# Patient Record
Sex: FEMALE | Race: WHITE | URBAN - METROPOLITAN AREA
[De-identification: names, ages, dates, MRNs, and addresses within clinical notes are randomized per-mention and may not be internally consistent; named-entity substitution may affect disease eponyms.]

---

## 2017-03-09 ENCOUNTER — TELEPHONE (OUTPATIENT)
Dept: FAMILY MEDICINE CLINIC | Facility: CLINIC | Age: 32
End: 2017-03-09

## 2017-03-09 RX ORDER — ERGOCALCIFEROL 1.25 MG/1
CAPSULE ORAL
Refills: 0 | Status: CANCELLED | OUTPATIENT
Start: 2017-03-09

## 2017-03-09 RX ORDER — ERGOCALCIFEROL 1.25 MG/1
CAPSULE ORAL
COMMUNITY
Start: 2016-11-04 | End: 2017-04-04

## 2017-03-09 NOTE — TELEPHONE ENCOUNTER
Called patient to ask which pharmacy she wants vit d sent to [facility],  Left message for patient to return my call.    03/09/2017, 4:30 PM

## 2017-03-13 RX ORDER — BIOTIN 1 MG
1 TABLET ORAL DAILY
Qty: 30 CAPSULE | Refills: 0 | COMMUNITY
Start: 2017-03-13

## 2017-03-13 NOTE — TELEPHONE ENCOUNTER
Pt informed, states she finished her supplement 2 wks ago. Pt will call back to get scheduled for lab appt. Renewal request from [facility] for 50,000 IU removed. Pt  States she will take 1,000 IU daily interim. Okay?

## 2017-04-04 ENCOUNTER — APPOINTMENT (OUTPATIENT)
Dept: LAB | Age: 32
End: 2017-04-04
Attending: FAMILY MEDICINE
Payer: COMMERCIAL

## 2017-04-04 ENCOUNTER — OFFICE VISIT (OUTPATIENT)
Dept: FAMILY MEDICINE CLINIC | Facility: CLINIC | Age: 32
End: 2017-04-04

## 2017-04-04 VITALS
DIASTOLIC BLOOD PRESSURE: 70 MMHG | HEIGHT: 67 IN | HEART RATE: 100 BPM | TEMPERATURE: 98 F | RESPIRATION RATE: 12 BRPM | SYSTOLIC BLOOD PRESSURE: 110 MMHG | WEIGHT: 164 LBS | BODY MASS INDEX: 25.74 KG/M2

## 2017-04-04 DIAGNOSIS — E55.9 VITAMIN D DEFICIENCY: ICD-10-CM

## 2017-04-04 DIAGNOSIS — J02.0 STREP PHARYNGITIS: Primary | ICD-10-CM

## 2017-04-04 PROCEDURE — 87880 STREP A ASSAY W/OPTIC: CPT | Performed by: NURSE PRACTITIONER

## 2017-04-04 PROCEDURE — 82306 VITAMIN D 25 HYDROXY: CPT

## 2017-04-04 PROCEDURE — 99213 OFFICE O/P EST LOW 20 MIN: CPT | Performed by: NURSE PRACTITIONER

## 2017-04-04 RX ORDER — AMOXICILLIN 875 MG/1
875 TABLET, COATED ORAL 2 TIMES DAILY
Qty: 20 TABLET | Refills: 0 | Status: SHIPPED | OUTPATIENT
Start: 2017-04-04 | End: 2017-04-14

## 2017-04-05 NOTE — PATIENT INSTRUCTIONS
Directed to take antibiotics until gone. Recommend to eat yogurt or take probiotic daily while on antibiotic. Treat symptoms as needed. No public exposure until on antibiotic for at least 24 hours. Return to clinic if not better in 48-72 hours.   Other

## 2017-04-05 NOTE — PROGRESS NOTES
HPI:    Patient ID: Yohan Gee is a 27year old female. HPI     Present with concerns about a sore throat. States her throat feels like there is nice in her throat. No known exposure to any strep infection.   States she has kids at home they are normal and breath sounds normal. No respiratory distress. She has no wheezes. She has no rales. Musculoskeletal: Normal range of motion. Lymphadenopathy:     She has cervical adenopathy.    Neurological: She is alert and oriented to person, place, and t

## 2017-04-06 ENCOUNTER — TELEPHONE (OUTPATIENT)
Dept: FAMILY MEDICINE CLINIC | Facility: CLINIC | Age: 32
End: 2017-04-06

## 2017-04-06 NOTE — TELEPHONE ENCOUNTER
Patient informed of results. Expressed understanding.    Patient is currently taking 1000U daily of Vitamin D

## 2017-04-06 NOTE — TELEPHONE ENCOUNTER
Ok, then recommend 2000 iu daily-we discussed further with Dr. Sherida Bloch at her next physical.  Which it looks like she is due for any time, please have patient schedule.

## 2017-04-06 NOTE — TELEPHONE ENCOUNTER
Did she fill the prescription and take the once a week prescription vitamin D 50,000 units was given back in November? If not I would give her the prescription and have her take vitamin D once a week for 12 weeks.   If she did take the prescription - then

## 2017-05-10 ENCOUNTER — OFFICE VISIT (OUTPATIENT)
Dept: FAMILY MEDICINE CLINIC | Facility: CLINIC | Age: 32
End: 2017-05-10

## 2017-05-10 VITALS
TEMPERATURE: 99 F | BODY MASS INDEX: 25.41 KG/M2 | HEART RATE: 76 BPM | SYSTOLIC BLOOD PRESSURE: 110 MMHG | RESPIRATION RATE: 16 BRPM | HEIGHT: 66.5 IN | DIASTOLIC BLOOD PRESSURE: 80 MMHG | WEIGHT: 160 LBS

## 2017-05-10 DIAGNOSIS — J02.0 STREP PHARYNGITIS: Primary | ICD-10-CM

## 2017-05-10 DIAGNOSIS — J02.9 SORE THROAT: ICD-10-CM

## 2017-05-10 PROCEDURE — 99213 OFFICE O/P EST LOW 20 MIN: CPT | Performed by: NURSE PRACTITIONER

## 2017-05-10 PROCEDURE — 87880 STREP A ASSAY W/OPTIC: CPT | Performed by: NURSE PRACTITIONER

## 2017-05-10 RX ORDER — CEPHALEXIN 500 MG/1
500 CAPSULE ORAL 3 TIMES DAILY
Qty: 30 CAPSULE | Refills: 0 | Status: SHIPPED | OUTPATIENT
Start: 2017-05-10 | End: 2017-05-20

## 2017-05-10 NOTE — PROGRESS NOTES
CHIEF COMPLAINT:   Patient presents with:  Sore Throat: [id number]       HPI:   Elyse George is a 27year old female who presents to clinic today with complaints of sore throat for 3 days,   Daughter with strep.    Some abdominal pain and headach Corinne Escobedo is a 27year old female who presents with ear problems symptoms are consistent with  ASSESSMENT:  Strep pharyngitis  (primary encounter diagnosis)  Sore throat    PLAN: Meds as listed below.   Comfort measures as described in Patient Instructions  Radha Estes

## 2017-09-27 ENCOUNTER — OFFICE VISIT (OUTPATIENT)
Dept: FAMILY MEDICINE CLINIC | Facility: CLINIC | Age: 32
End: 2017-09-27

## 2017-09-27 VITALS
WEIGHT: 182.63 LBS | SYSTOLIC BLOOD PRESSURE: 128 MMHG | OXYGEN SATURATION: 98 % | BODY MASS INDEX: 29 KG/M2 | TEMPERATURE: 100 F | DIASTOLIC BLOOD PRESSURE: 68 MMHG | HEART RATE: 111 BPM

## 2017-09-27 DIAGNOSIS — J03.90 TONSILLITIS: Primary | ICD-10-CM

## 2017-09-27 LAB
CONTROL LINE PRESENT WITH A CLEAR BACKGROUND (YES/NO): YES YES/NO
STREP GRP A CUL-SCR: NEGATIVE

## 2017-09-27 PROCEDURE — 99213 OFFICE O/P EST LOW 20 MIN: CPT | Performed by: NURSE PRACTITIONER

## 2017-09-27 PROCEDURE — 87081 CULTURE SCREEN ONLY: CPT | Performed by: NURSE PRACTITIONER

## 2017-09-27 PROCEDURE — 87880 STREP A ASSAY W/OPTIC: CPT | Performed by: NURSE PRACTITIONER

## 2017-09-27 RX ORDER — CEPHALEXIN 500 MG/1
500 CAPSULE ORAL 3 TIMES DAILY
Qty: 30 CAPSULE | Refills: 0 | Status: SHIPPED | OUTPATIENT
Start: 2017-09-27 | End: 2017-10-07

## 2017-09-27 NOTE — PROGRESS NOTES
CHIEF COMPLAINT:   Patient presents with: Body ache and/or chills  Cough  Pharyngitis  Fever  Nasal Congestion      HPI:   Jovany Sky is a 34year old female who presents to clinic today with complaints of fever 102.5  Has been ill for 3 days.   Marguerite Moore non-tender  THYROID: Normal size, no nodules  LUNGS: clear to auscultation bilaterally, no wheezes or rhonchi. Breathing is non labored. CARDIO: RRR without murmur  ABDOMEN: Soft, nontender, no guarding, no rebound, no masses, no hepatosplenomegaly.   EXTR

## 2017-09-29 ENCOUNTER — TELEPHONE (OUTPATIENT)
Dept: FAMILY MEDICINE CLINIC | Facility: CLINIC | Age: 32
End: 2017-09-29

## 2017-09-29 NOTE — TELEPHONE ENCOUNTER
----- Message from OBINNA Gray sent at 9/29/2017  1:43 PM CDT -----  Negative strep culture- please notify patient

## 2019-05-09 ENCOUNTER — OFFICE VISIT (OUTPATIENT)
Dept: FAMILY MEDICINE CLINIC | Facility: CLINIC | Age: 34
End: 2019-05-09
Payer: COMMERCIAL

## 2019-05-09 VITALS
WEIGHT: 183.63 LBS | DIASTOLIC BLOOD PRESSURE: 80 MMHG | SYSTOLIC BLOOD PRESSURE: 132 MMHG | BODY MASS INDEX: 29.16 KG/M2 | RESPIRATION RATE: 16 BRPM | TEMPERATURE: 98 F | OXYGEN SATURATION: 100 % | HEIGHT: 66.5 IN | HEART RATE: 82 BPM

## 2019-05-09 DIAGNOSIS — R30.0 BURNING WITH URINATION: Primary | ICD-10-CM

## 2019-05-09 PROCEDURE — 87088 URINE BACTERIA CULTURE: CPT | Performed by: NURSE PRACTITIONER

## 2019-05-09 PROCEDURE — 81003 URINALYSIS AUTO W/O SCOPE: CPT | Performed by: NURSE PRACTITIONER

## 2019-05-09 PROCEDURE — 99214 OFFICE O/P EST MOD 30 MIN: CPT | Performed by: NURSE PRACTITIONER

## 2019-05-09 PROCEDURE — 87086 URINE CULTURE/COLONY COUNT: CPT | Performed by: NURSE PRACTITIONER

## 2019-05-09 PROCEDURE — 87186 SC STD MICRODIL/AGAR DIL: CPT | Performed by: NURSE PRACTITIONER

## 2019-05-09 RX ORDER — CIPROFLOXACIN 500 MG/1
500 TABLET, FILM COATED ORAL 2 TIMES DAILY
Qty: 10 TABLET | Refills: 0 | Status: SHIPPED | OUTPATIENT
Start: 2019-05-09 | End: 2019-05-14

## 2019-05-09 NOTE — PROGRESS NOTES
HPI:   Patient presents with:  Urinary Symptoms (urologic): pt states burning with urination and urinary frequency x 3 days and today with back pain and nausea.        Gopi De is a 28year old female who complains of burning with urination, nausea

## 2019-11-12 ENCOUNTER — OFFICE VISIT (OUTPATIENT)
Dept: FAMILY MEDICINE CLINIC | Facility: CLINIC | Age: 34
End: 2019-11-12
Payer: COMMERCIAL

## 2019-11-12 VITALS
OXYGEN SATURATION: 98 % | SYSTOLIC BLOOD PRESSURE: 110 MMHG | BODY MASS INDEX: 31.76 KG/M2 | TEMPERATURE: 98 F | HEART RATE: 103 BPM | DIASTOLIC BLOOD PRESSURE: 68 MMHG | WEIGHT: 200 LBS | HEIGHT: 66.5 IN

## 2019-11-12 DIAGNOSIS — J40 SINOBRONCHITIS: Primary | ICD-10-CM

## 2019-11-12 DIAGNOSIS — J32.9 SINOBRONCHITIS: Primary | ICD-10-CM

## 2019-11-12 PROCEDURE — 99213 OFFICE O/P EST LOW 20 MIN: CPT | Performed by: NURSE PRACTITIONER

## 2019-11-12 RX ORDER — CLARITHROMYCIN 500 MG/1
500 TABLET, COATED ORAL 2 TIMES DAILY
Qty: 20 TABLET | Refills: 0 | Status: SHIPPED | OUTPATIENT
Start: 2019-11-12 | End: 2019-11-22

## 2019-11-12 NOTE — PATIENT INSTRUCTIONS
Rest, increase fluids, salt water gargles ,neti pot (use distilled water) or saline nasal spray, Vaseline to nares, Mucinex-DM  Tylenol/Ibuprofen, follow up if symptoms persist or increase.

## 2019-11-12 NOTE — PROGRESS NOTES
CHIEF COMPLAINT:   Patient presents with:  Cough: productive  Other: chest tightness  Epistaxis      HPI:   Audi Contreras is a 27year old female who presents to clinic today with complaints of feeling ill since last Thursday - fatigue , coughing- non-tender  THYROID: Normal size, no nodules  LUNGS: Harsh, bronchial cough. Lungs are otherwise clear to auscultation bilaterally, no wheezes or rhonchi. Breathing is non labored.   CARDIO: RRR without murmur  SKIN: no rashes,no suspicious lesions  ASSESS

## 2020-06-27 ENCOUNTER — OFFICE VISIT (OUTPATIENT)
Dept: FAMILY MEDICINE CLINIC | Facility: CLINIC | Age: 35
End: 2020-06-27
Payer: COMMERCIAL

## 2020-06-27 VITALS
RESPIRATION RATE: 20 BRPM | DIASTOLIC BLOOD PRESSURE: 96 MMHG | SYSTOLIC BLOOD PRESSURE: 154 MMHG | BODY MASS INDEX: 34.06 KG/M2 | WEIGHT: 217 LBS | HEART RATE: 88 BPM | TEMPERATURE: 99 F | HEIGHT: 66.75 IN

## 2020-06-27 DIAGNOSIS — Z00.00 ANNUAL PHYSICAL EXAM: Primary | ICD-10-CM

## 2020-06-27 DIAGNOSIS — F32.1 CURRENT MODERATE EPISODE OF MAJOR DEPRESSIVE DISORDER WITHOUT PRIOR EPISODE (HCC): ICD-10-CM

## 2020-06-27 DIAGNOSIS — E55.9 VITAMIN D DEFICIENCY: ICD-10-CM

## 2020-06-27 DIAGNOSIS — R53.83 FATIGUE, UNSPECIFIED TYPE: ICD-10-CM

## 2020-06-27 PROCEDURE — 80050 GENERAL HEALTH PANEL: CPT | Performed by: FAMILY MEDICINE

## 2020-06-27 PROCEDURE — 83550 IRON BINDING TEST: CPT | Performed by: FAMILY MEDICINE

## 2020-06-27 PROCEDURE — 83540 ASSAY OF IRON: CPT | Performed by: FAMILY MEDICINE

## 2020-06-27 PROCEDURE — 82728 ASSAY OF FERRITIN: CPT | Performed by: FAMILY MEDICINE

## 2020-06-27 PROCEDURE — 81001 URINALYSIS AUTO W/SCOPE: CPT | Performed by: FAMILY MEDICINE

## 2020-06-27 PROCEDURE — 80061 LIPID PANEL: CPT | Performed by: FAMILY MEDICINE

## 2020-06-27 PROCEDURE — 99395 PREV VISIT EST AGE 18-39: CPT | Performed by: FAMILY MEDICINE

## 2020-06-27 PROCEDURE — 82306 VITAMIN D 25 HYDROXY: CPT | Performed by: FAMILY MEDICINE

## 2020-06-27 RX ORDER — SERTRALINE HYDROCHLORIDE 25 MG/1
25 TABLET, FILM COATED ORAL DAILY
Qty: 30 TABLET | Refills: 1 | Status: SHIPPED | OUTPATIENT
Start: 2020-06-27 | End: 2020-07-27

## 2020-06-27 NOTE — PROGRESS NOTES
[address]  PROGRESS NOTE  Chief Complaint:   Patient presents with:   Well Adult: pt goes to gyne  Depression: pt emotional/tearful      HPI:   This is a 27year old female coming in for establish care and depressed mood    PHQ 9-  Score Value Ref Range    TSH 2.630 0.358 - 3.740 mIU/mL   URINALYSIS WITH CULTURE REFLEX   Result Value Ref Range    Urine Color Yellow Yellow    Clarity Urine Hazy (A) Clear    Spec Gravity 1.018 1.001 - 1.030    Glucose Urine Negative Negative mg/dl    Bilirub 0.00 - 1.00 x10(3) uL    Neutrophil % 60.2 %    Lymphocyte % 28.6 %    Monocyte % 8.6 %    Eosinophil % 1.9 %    Basophil % 0.5 %    Immature Granulocyte % 0.2 %       Wt Readings from Last 6 Encounters:  06/27/20 : 217 lb (98.4 kg)  11/12/19 : 200 lb (90. wheezing, cough or sputum. GASTROINTESTINAL:  Denies abdominal pain, nausea, vomiting, constipation, diarrhea, or blood in stool. MUSCULOSKELETAL:  Denies weakness, muscle aches, back pain, joint pain, swelling or stiffness.   NEUROLOGICAL:  Denies headac no clubbing, FROM, 2+ dorsalis pedis pulses bilaterally. NEURO:  No deficit, normal gait, strength and tone, sensory intact, normal reflexes. PSYCH:sad, tearful. Behavior is normal. Judgement and thought content are normal    ASSESSMENT AND PLAN:   1.  An without prior episode [facility])      Patient Instructions   rec counceling- Yady Lainez- can make appt with her here    rec labs today    rec sertraline medication    Recheck 4 weeks    Encourage exercise 3-5 x a week            Meds & Refills for this Visit:

## 2020-06-28 DIAGNOSIS — R74.8 ELEVATED LIVER ENZYMES: ICD-10-CM

## 2020-06-28 DIAGNOSIS — E55.9 VITAMIN D DEFICIENCY: Primary | ICD-10-CM

## 2020-06-28 PROBLEM — R53.83 FATIGUE: Status: ACTIVE | Noted: 2020-06-28

## 2020-06-28 PROBLEM — F32.1 CURRENT MODERATE EPISODE OF MAJOR DEPRESSIVE DISORDER WITHOUT PRIOR EPISODE (HCC): Status: ACTIVE | Noted: 2020-06-28

## 2020-06-28 RX ORDER — ERGOCALCIFEROL 1.25 MG/1
50000 CAPSULE ORAL WEEKLY
Qty: 12 CAPSULE | Refills: 0 | Status: SHIPPED | OUTPATIENT
Start: 2020-06-28 | End: 2020-09-14

## 2020-06-29 ENCOUNTER — TELEPHONE (OUTPATIENT)
Dept: FAMILY MEDICINE CLINIC | Facility: CLINIC | Age: 35
End: 2020-06-29

## 2020-06-29 NOTE — TELEPHONE ENCOUNTER
Pt contacted. Pt states she was able to see results in my chart, also reviewed with pt.     Future Appointments   Date Time Provider [place] Franco   7/27/2020  9:45 AM Shaq Forrest MD [facility]

## 2020-06-29 NOTE — TELEPHONE ENCOUNTER
----- Message from Jing Nye MD sent at 6/28/2020  2:04 PM CDT -----  Laboratory results reviewed.   Patient chemistry profile normal.She has 1 single liver enzyme elevated the rest are all normal.  I would encourage her to limit Tylenol and alcoho

## 2020-07-27 NOTE — PROGRESS NOTES
Marti Do is a 27year old female. Patient presents with:  Medication Follow-Up: pt here for f/u after starting zoloft and upping vitamin d      HPI:   Patient presents for recheck of her anxiety.  Pt has been taking medications as instructed, no - 56 U/L    Alkaline Phosphatase 66 37 - 98 U/L    Bilirubin, Total 0.5 0.1 - 2.0 mg/dL    Total Protein 8.2 6.4 - 8.2 g/dL    Albumin 4.0 3.4 - 5.0 g/dL    Globulin  4.2 2.8 - 4.4 g/dL    A/G Ratio 1.0 1.0 - 2.0    FASTING Yes    LIPID PANEL   Result Valu 40.1 35.0 - 48.0 %    .0 150.0 - 450.0 10(3)uL    MCV 88.7 80.0 - 100.0 fL    MCH 29.0 26.0 - 34.0 pg    MCHC 32.7 31.0 - 37.0 g/dL    RDW 12.8 11.0 - 15.0 %    RDW-SD 41.6 35.1 - 46.3 fL    Neutrophil Absolute Prelim 3.88 1.50 - 7.70 x10 (3) uL

## 2020-09-25 ENCOUNTER — TELEPHONE (OUTPATIENT)
Dept: FAMILY MEDICINE CLINIC | Facility: CLINIC | Age: 35
End: 2020-09-25

## 2020-09-25 NOTE — TELEPHONE ENCOUNTER
Future Appointments   Date Time Provider Orlando Franco   10/13/2020  8:45 AM [facility]   10/20/2020  4:30 PM Lizbeth Tidwell MD [facility]       Would like to increase Zoloft

## 2020-09-25 NOTE — TELEPHONE ENCOUNTER
Pt states that she would like to have her Sertraline increased. She states that she is feeling more stressed with having 3 kids that are all doing different types of E-[employer].  She states that she feels down like she did when she came in on 7/27 and just

## 2020-09-25 NOTE — TELEPHONE ENCOUNTER
Ok to increase zoloft from 25 mg to 50 mg   Daily- follow up for office visit in 4-6 weeks.      [facility] to [facility]

## 2020-09-25 NOTE — TELEPHONE ENCOUNTER
Pt notified and verbalized understanding. She has an upcoming appt scheduled already.       Future Appointments   Date Time Provider Orlando Joan   10/13/2020  8:45 AM [facility]   10/20/2020  4:30 PM Shaq Forrest MD EM

## 2020-10-15 ENCOUNTER — LABORATORY ENCOUNTER (OUTPATIENT)
Dept: LAB | Age: 35
End: 2020-10-15
Attending: FAMILY MEDICINE
Payer: COMMERCIAL

## 2020-10-15 DIAGNOSIS — R74.8 ELEVATED LIVER ENZYMES: ICD-10-CM

## 2020-10-15 DIAGNOSIS — E55.9 VITAMIN D DEFICIENCY: ICD-10-CM

## 2020-10-15 PROCEDURE — 80053 COMPREHEN METABOLIC PANEL: CPT | Performed by: FAMILY MEDICINE

## 2020-10-15 PROCEDURE — 82306 VITAMIN D 25 HYDROXY: CPT | Performed by: FAMILY MEDICINE

## 2020-10-15 PROCEDURE — 36415 COLL VENOUS BLD VENIPUNCTURE: CPT | Performed by: FAMILY MEDICINE

## 2020-10-20 NOTE — PROGRESS NOTES
Olivia Montilla is a 38year old female. Patient presents with:  Medication Follow-Up: sertraline      Olivia Montilla  verbally consents to a Virtual/Telephone Check-In service on 10/20/2020.     Patient understands and accepts financial responsibili 9 7 - 18 mg/dL    Creatinine 0.86 0.55 - 1.02 mg/dL    BUN/CREA Ratio 10.5 10.0 - 20.0    Calcium, Total 9.2 8.5 - 10.1 mg/dL    Calculated Osmolality 280 275 - 295 mOsm/kg    GFR, Non- 87 >=60    GFR, -American 101 >=60    AST 61 (H time.  This time was  also spent on reviewing labs, medications, radiology tests and decision making.   Appropriate medical decision-making and tests are ordered as detailed in the plan of care above

## 2020-10-20 NOTE — PATIENT INSTRUCTIONS
Continue medication    Encourage trial elimination diet and monitor    Encourage exercise    Recheck labs 6 months with medical fStephanu then also

## 2021-04-15 NOTE — TELEPHONE ENCOUNTER
Future appt:    Last Appointment with provider:   Telemed;10/20/20(gilda)-F/U in 6 months  Last appointment at [facility]:  Visit date not found  Last px-6/27/20    Sertraline HCl 50 MG Oral Tab    90tab  1refill        Filled:10/20/20 Summary: Take 1 ta

## 2021-07-20 DIAGNOSIS — F32.1 CURRENT MODERATE EPISODE OF MAJOR DEPRESSIVE DISORDER WITHOUT PRIOR EPISODE (HCC): ICD-10-CM

## 2021-07-20 NOTE — TELEPHONE ENCOUNTER
Future appt:    Last Appointment with provider:   Visit date not found  Last appointment at [facility]:  Visit date not found  Last virtual appt: 10/20/20  Last px: 6/27/20    Sertraline refilled on 4/15/21 for #90, 0 refills    Pt due for px  LM for pt

## 2021-07-21 NOTE — TELEPHONE ENCOUNTER
Continue to offer the breast at early feeding cues, paying close attention to positioning for latch  Supplement as needed via paced bottle feeding  Remember to encourage Benito Hirsch to open her mouth wide before putting the bottle in her mouth  Continue to pump as often as you have been to encourage your milk supply to increase  Follow up with Dr Laura Young next week  Please call with any additional questions or concerns  Left message for pt    No future appointments.

## 2021-07-22 NOTE — TELEPHONE ENCOUNTER
Needs RF of Sertraline  To  [place] in [facility]     appt   8/17      Future Appointments   Date Time Provider Orlando Franco   8/17/2021 10:30 AM Emmett Parsons MD [facility]

## 2021-07-22 NOTE — TELEPHONE ENCOUNTER
Future appt:     Your appointments     Date & Time Appointment Department [facility])    Aug 17, 2021 10:30 AM CDT Physical - Established with Shiraz Jolley MD [facility], [address]

## 2022-10-18 ENCOUNTER — OFFICE VISIT (OUTPATIENT)
Dept: FAMILY MEDICINE CLINIC | Facility: CLINIC | Age: 37
End: 2022-10-18
Payer: COMMERCIAL

## 2022-10-18 ENCOUNTER — LAB ENCOUNTER (OUTPATIENT)
Dept: LAB | Age: 37
End: 2022-10-18
Attending: FAMILY MEDICINE
Payer: COMMERCIAL

## 2022-10-18 VITALS
SYSTOLIC BLOOD PRESSURE: 104 MMHG | RESPIRATION RATE: 16 BRPM | HEART RATE: 82 BPM | BODY MASS INDEX: 20.15 KG/M2 | TEMPERATURE: 97 F | OXYGEN SATURATION: 99 % | WEIGHT: 125.38 LBS | HEIGHT: 66.25 IN | DIASTOLIC BLOOD PRESSURE: 72 MMHG

## 2022-10-18 DIAGNOSIS — E55.9 VITAMIN D DEFICIENCY: ICD-10-CM

## 2022-10-18 DIAGNOSIS — Z00.00 ANNUAL PHYSICAL EXAM: ICD-10-CM

## 2022-10-18 DIAGNOSIS — E78.00 PURE HYPERCHOLESTEROLEMIA: ICD-10-CM

## 2022-10-18 DIAGNOSIS — F41.9 ANXIETY: ICD-10-CM

## 2022-10-18 DIAGNOSIS — Z00.00 ANNUAL PHYSICAL EXAM: Primary | ICD-10-CM

## 2022-10-18 DIAGNOSIS — Z86.59 HISTORY OF DEPRESSION: ICD-10-CM

## 2022-10-18 PROBLEM — R53.83 FATIGUE: Status: RESOLVED | Noted: 2020-06-28 | Resolved: 2022-10-18

## 2022-10-18 LAB
ALBUMIN SERPL-MCNC: 4.1 G/DL (ref 3.4–5)
ALBUMIN/GLOB SERPL: 1.1 {RATIO} (ref 1–2)
ALP LIVER SERPL-CCNC: 32 U/L
ALT SERPL-CCNC: 18 U/L
ANION GAP SERPL CALC-SCNC: 5 MMOL/L (ref 0–18)
AST SERPL-CCNC: 11 U/L (ref 15–37)
BASOPHILS # BLD AUTO: 0.01 X10(3) UL (ref 0–0.2)
BASOPHILS NFR BLD AUTO: 0.2 %
BILIRUB SERPL-MCNC: 0.2 MG/DL (ref 0.1–2)
BILIRUB UR QL STRIP.AUTO: NEGATIVE
BUN BLD-MCNC: 11 MG/DL (ref 7–18)
CALCIUM BLD-MCNC: 9.3 MG/DL (ref 8.5–10.1)
CHLORIDE SERPL-SCNC: 107 MMOL/L (ref 98–112)
CHOLEST SERPL-MCNC: 267 MG/DL (ref ?–200)
CLARITY UR REFRACT.AUTO: CLEAR
CO2 SERPL-SCNC: 25 MMOL/L (ref 21–32)
COLOR UR AUTO: COLORLESS
CREAT BLD-MCNC: 0.82 MG/DL
EOSINOPHIL # BLD AUTO: 0.05 X10(3) UL (ref 0–0.7)
EOSINOPHIL NFR BLD AUTO: 0.9 %
ERYTHROCYTE [DISTWIDTH] IN BLOOD BY AUTOMATED COUNT: 13.7 %
FASTING PATIENT LIPID ANSWER: YES
FASTING STATUS PATIENT QL REPORTED: YES
GFR SERPLBLD BASED ON 1.73 SQ M-ARVRAT: 94 ML/MIN/1.73M2 (ref 60–?)
GLOBULIN PLAS-MCNC: 3.8 G/DL (ref 2.8–4.4)
GLUCOSE BLD-MCNC: 94 MG/DL (ref 70–99)
GLUCOSE UR STRIP.AUTO-MCNC: NEGATIVE MG/DL
HCT VFR BLD AUTO: 41.7 %
HDLC SERPL-MCNC: 67 MG/DL (ref 40–59)
HGB BLD-MCNC: 13.6 G/DL
IMM GRANULOCYTES # BLD AUTO: 0.01 X10(3) UL (ref 0–1)
IMM GRANULOCYTES NFR BLD: 0.2 %
KETONES UR STRIP.AUTO-MCNC: NEGATIVE MG/DL
LDLC SERPL CALC-MCNC: 186 MG/DL (ref ?–100)
LEUKOCYTE ESTERASE UR QL STRIP.AUTO: NEGATIVE
LYMPHOCYTES # BLD AUTO: 1.88 X10(3) UL (ref 1–4)
LYMPHOCYTES NFR BLD AUTO: 34.8 %
MCH RBC QN AUTO: 30.3 PG (ref 26–34)
MCHC RBC AUTO-ENTMCNC: 32.6 G/DL (ref 31–37)
MCV RBC AUTO: 92.9 FL
MONOCYTES # BLD AUTO: 0.34 X10(3) UL (ref 0.1–1)
MONOCYTES NFR BLD AUTO: 6.3 %
NEUTROPHILS # BLD AUTO: 3.11 X10 (3) UL (ref 1.5–7.7)
NEUTROPHILS # BLD AUTO: 3.11 X10(3) UL (ref 1.5–7.7)
NEUTROPHILS NFR BLD AUTO: 57.6 %
NITRITE UR QL STRIP.AUTO: NEGATIVE
NONHDLC SERPL-MCNC: 200 MG/DL (ref ?–130)
OSMOLALITY SERPL CALC.SUM OF ELEC: 283 MOSM/KG (ref 275–295)
PH UR STRIP.AUTO: 6 [PH] (ref 5–8)
PLATELET # BLD AUTO: 312 10(3)UL (ref 150–450)
POTASSIUM SERPL-SCNC: 3.7 MMOL/L (ref 3.5–5.1)
PROT SERPL-MCNC: 7.9 G/DL (ref 6.4–8.2)
PROT UR STRIP.AUTO-MCNC: NEGATIVE MG/DL
RBC # BLD AUTO: 4.49 X10(6)UL
RBC UR QL AUTO: NEGATIVE
SODIUM SERPL-SCNC: 137 MMOL/L (ref 136–145)
SP GR UR STRIP.AUTO: 1 (ref 1–1.03)
TRIGL SERPL-MCNC: 82 MG/DL (ref 30–149)
TSI SER-ACNC: 1.48 MIU/ML (ref 0.36–3.74)
UROBILINOGEN UR STRIP.AUTO-MCNC: <2 MG/DL
VIT B12 SERPL-MCNC: 659 PG/ML (ref 193–986)
VIT D+METAB SERPL-MCNC: 67.5 NG/ML (ref 30–100)
VLDLC SERPL CALC-MCNC: 17 MG/DL (ref 0–30)
WBC # BLD AUTO: 5.4 X10(3) UL (ref 4–11)

## 2022-10-18 PROCEDURE — 3008F BODY MASS INDEX DOCD: CPT | Performed by: FAMILY MEDICINE

## 2022-10-18 PROCEDURE — 81003 URINALYSIS AUTO W/O SCOPE: CPT | Performed by: FAMILY MEDICINE

## 2022-10-18 PROCEDURE — 82607 VITAMIN B-12: CPT | Performed by: FAMILY MEDICINE

## 2022-10-18 PROCEDURE — 80050 GENERAL HEALTH PANEL: CPT | Performed by: FAMILY MEDICINE

## 2022-10-18 PROCEDURE — 82306 VITAMIN D 25 HYDROXY: CPT | Performed by: FAMILY MEDICINE

## 2022-10-18 PROCEDURE — 3074F SYST BP LT 130 MM HG: CPT | Performed by: FAMILY MEDICINE

## 2022-10-18 PROCEDURE — 80061 LIPID PANEL: CPT | Performed by: FAMILY MEDICINE

## 2022-10-18 PROCEDURE — 3078F DIAST BP <80 MM HG: CPT | Performed by: FAMILY MEDICINE

## 2022-10-18 PROCEDURE — 99395 PREV VISIT EST AGE 18-39: CPT | Performed by: FAMILY MEDICINE

## 2022-10-18 RX ORDER — ESCITALOPRAM OXALATE 10 MG/1
10 TABLET ORAL DAILY
Qty: 30 TABLET | Refills: 1 | Status: SHIPPED | OUTPATIENT
Start: 2022-10-18

## 2022-10-18 RX ORDER — VALACYCLOVIR HYDROCHLORIDE 500 MG/1
TABLET, FILM COATED ORAL
COMMUNITY
Start: 2022-09-30

## 2022-10-18 NOTE — PATIENT INSTRUCTIONS
rec fasting labs    Consider flu and covid vaccines    Continue gyne care    rx lexapro for anxiety  Recheck 1 month

## 2022-11-11 RX ORDER — ESCITALOPRAM OXALATE 10 MG/1
10 TABLET ORAL DAILY
Qty: 30 TABLET | Refills: 1 | OUTPATIENT
Start: 2022-11-11

## 2022-11-11 RX ORDER — VALACYCLOVIR HYDROCHLORIDE 500 MG/1
500 TABLET, FILM COATED ORAL DAILY
Qty: 39 TABLET | Refills: 0 | OUTPATIENT
Start: 2022-11-11

## 2022-11-11 NOTE — TELEPHONE ENCOUNTER
Future appt:    Last Appointment with provider:   10/18/2022  Last appointment at [facility]:  10/18/2022      Escitalopram prescribed on 10/18/22 for #30, 1 refill  Refill request declined at this time. Pt contacted -scheduled for one month med f/u    Future Appointments   Date Time Provider Orlando Franco   11/30/2022  3:00 PM Osman Mccain MD [facility]             Cholesterol, Total (mg/dL)   Date Value   10/18/2022 267 (H)     HDL Cholesterol (mg/dL)   Date Value   10/18/2022 67 (H)     LDL Cholesterol (mg/dL)   Date Value   10/18/2022 186 (H)     Triglycerides (mg/dL)   Date Value   10/18/2022 82     No results found for: EAG, A1C  Lab Results   Component Value Date    TSH 1.480 10/18/2022       No follow-ups on file.

## 2022-11-11 NOTE — TELEPHONE ENCOUNTER
Future appt: Your appointments     Date & Time Appointment Department [facility])    Nov 30, 2022  3:00 PM CST Exam - [facility]            [facility] [id number]  [phone]        Last Appointment with provider:   10/18/2022Cemily Lawrence  Last appointment at [facility]:  10/18/2022      Valacyclovir - marked as external-    LM for pt    Cholesterol, Total (mg/dL)   Date Value   10/18/2022 267 (H)     HDL Cholesterol (mg/dL)   Date Value   10/18/2022 67 (H)     LDL Cholesterol (mg/dL)   Date Value   10/18/2022 186 (H)     Triglycerides (mg/dL)   Date Value   10/18/2022 82     No results found for: EAG, A1C  Lab Results   Component Value Date    TSH 1.480 10/18/2022       No follow-ups on file.

## 2022-11-11 NOTE — TELEPHONE ENCOUNTER
Pt called back-  Pt states she has taken Valcyclovir for cold sores-  Per pt- last RX given 9/29/21  Pt states she was given RX from online physician via [facility] -Dr. Jameel Adames states she was instructed to take daily for cold sore suppression.     Pt has 4 tablets left- states she feels cold sore coming on-  Pt states she can talk to CR more about this if there are concerns at her upcoming appt on 11/30

## 2022-11-11 NOTE — TELEPHONE ENCOUNTER
Plan for discussion at appointment of new problem and medication  30 day rx given as pt nearly out of medication.  longterm use to be d/w pt at appointment

## 2023-06-23 RX ORDER — ESCITALOPRAM OXALATE 10 MG/1
10 TABLET ORAL DAILY
Qty: 90 TABLET | Refills: 0 | Status: SHIPPED | OUTPATIENT
Start: 2023-06-23

## 2023-06-23 NOTE — TELEPHONE ENCOUNTER
Future appt:    Last Appointment with provider:   Visit date not found  Last appointment at [facility]:  Visit date not found  Last px: 10/18/22-  Pedro Ledbetter was due in one month      Fax from [facility]-requesting refill  Pt overdue for med check visit  Escitalopram refilled 11/30/22  For #90, 1 refill    LM for pt    Cholesterol, Total (mg/dL)   Date Value   10/18/2022 267 (H)     HDL Cholesterol (mg/dL)   Date Value   10/18/2022 67 (H)     LDL Cholesterol (mg/dL)   Date Value   10/18/2022 186 (H)     Triglycerides (mg/dL)   Date Value   10/18/2022 82     No results found for: EAG, A1C  Lab Results   Component Value Date    TSH 1.480 10/18/2022       No follow-ups on file.

## 2023-06-23 NOTE — TELEPHONE ENCOUNTER
Pt had telemed visit on 11/30/22 re: anxiety. Pt states she is doing well with medication. Pt will call back to schedule appt for px- due in October.

## (undated) NOTE — MR AVS SNAPSHOT
[address]  [place] [phone]  [phone]               Thank you for choosing us [facility]   We are glad to serve you and happy to provide you with this summary o STREP A ASSAY W/OPTIC      Component Value Standard Range & Units    STREP GRP A CUL-SCR positive Negative    Control Line Present with a clear background (yes/no) yes Yes/No    Kit Lot # [id number] Numeric    Kit Expiration Date 6-2018 Date Get your heart pumping – brisk walking, biking, swimming Even 10 minute increments are effective and add up over the week   2 ½ hours per week – spread out over time Use a jake to keep you motivated   Don’t forget strength training with weights and resist

## (undated) NOTE — LETTER
05/19/21        Naheed Chaidez  [address]  [place] [address]      Dear Aaron Camp,    [facility] records indicate that you have outstanding lab work and or testing that was ordered for you and has not yet been completed:  Orders Placed This Encounter

## (undated) NOTE — MR AVS SNAPSHOT
[address] [place] [phone]               Thank you for choosing us for your health care visit with OBINNA Chance.   We are glad to serve you and happy to provide you with this summary o Component Value Standard Range & Units    STREP GRP A CUL-SCR Positive Negative    Control Line Present with a clear background (yes/no) Yes Yes/No    Kit Lot # [id number] Numeric    Kit Expiration Date 09- Date                  MyChart     Visit